# Patient Record
Sex: MALE | Race: WHITE | NOT HISPANIC OR LATINO | URBAN - METROPOLITAN AREA
[De-identification: names, ages, dates, MRNs, and addresses within clinical notes are randomized per-mention and may not be internally consistent; named-entity substitution may affect disease eponyms.]

---

## 2018-10-11 ENCOUNTER — ON CAMPUS - OUTPATIENT (AMBULATORY)
Dept: URBAN - METROPOLITAN AREA HOSPITAL 2 | Facility: HOSPITAL | Age: 68
End: 2018-10-11
Payer: COMMERCIAL

## 2018-10-11 ENCOUNTER — HOSPITAL ENCOUNTER (OUTPATIENT)
Dept: OTHER | Facility: HOSPITAL | Age: 68
Setting detail: SPECIMEN
Discharge: HOME | End: 2018-10-11
Attending: INTERNAL MEDICINE | Admitting: INTERNAL MEDICINE

## 2018-10-11 ENCOUNTER — OFFICE (AMBULATORY)
Dept: URBAN - METROPOLITAN AREA PATHOLOGY 4 | Facility: PATHOLOGY | Age: 68
End: 2018-10-11
Payer: COMMERCIAL

## 2018-10-11 VITALS
OXYGEN SATURATION: 99 % | SYSTOLIC BLOOD PRESSURE: 101 MMHG | HEART RATE: 61 BPM | SYSTOLIC BLOOD PRESSURE: 103 MMHG | SYSTOLIC BLOOD PRESSURE: 149 MMHG | RESPIRATION RATE: 18 BRPM | DIASTOLIC BLOOD PRESSURE: 56 MMHG | HEART RATE: 63 BPM | SYSTOLIC BLOOD PRESSURE: 123 MMHG | HEIGHT: 69 IN | OXYGEN SATURATION: 95 % | WEIGHT: 185 LBS | SYSTOLIC BLOOD PRESSURE: 142 MMHG | HEART RATE: 59 BPM | HEART RATE: 58 BPM | SYSTOLIC BLOOD PRESSURE: 106 MMHG | HEART RATE: 65 BPM | OXYGEN SATURATION: 98 % | TEMPERATURE: 98.1 F | SYSTOLIC BLOOD PRESSURE: 124 MMHG | RESPIRATION RATE: 16 BRPM | RESPIRATION RATE: 15 BRPM | OXYGEN SATURATION: 94 % | OXYGEN SATURATION: 97 % | DIASTOLIC BLOOD PRESSURE: 76 MMHG | DIASTOLIC BLOOD PRESSURE: 58 MMHG

## 2018-10-11 DIAGNOSIS — K52.9 NONINFECTIVE GASTROENTERITIS AND COLITIS, UNSPECIFIED: ICD-10-CM

## 2018-10-11 DIAGNOSIS — K63.5 POLYP OF COLON: ICD-10-CM

## 2018-10-11 DIAGNOSIS — D12.3 BENIGN NEOPLASM OF TRANSVERSE COLON: ICD-10-CM

## 2018-10-11 DIAGNOSIS — K57.30 DIVERTICULOSIS OF LARGE INTESTINE WITHOUT PERFORATION OR ABS: ICD-10-CM

## 2018-10-11 DIAGNOSIS — Z12.11 ENCOUNTER FOR SCREENING FOR MALIGNANT NEOPLASM OF COLON: ICD-10-CM

## 2018-10-11 DIAGNOSIS — K64.1 SECOND DEGREE HEMORRHOIDS: ICD-10-CM

## 2018-10-11 DIAGNOSIS — K63.3 ULCER OF INTESTINE: ICD-10-CM

## 2018-10-11 DIAGNOSIS — K64.4 RESIDUAL HEMORRHOIDAL SKIN TAGS: ICD-10-CM

## 2018-10-11 LAB
GI HISTOLOGY: A. UNSPECIFIED: (no result)
GI HISTOLOGY: B. UNSPECIFIED: (no result)
GI HISTOLOGY: PDF REPORT: (no result)

## 2018-10-11 PROCEDURE — 45380 COLONOSCOPY AND BIOPSY: CPT | Mod: PT | Performed by: INTERNAL MEDICINE

## 2018-10-11 PROCEDURE — 88305 TISSUE EXAM BY PATHOLOGIST: CPT | Mod: 26 | Performed by: INTERNAL MEDICINE

## 2018-10-11 NOTE — SERVICEHPINOTES
LY PORTER  is a  67  male   who presents today for a  Colonoscopy   for   the indications listed below. The updated Patient Profile was reviewed prior to the procedure, in conjunction with the Physical Exam, including medical conditions, surgical procedures, medications, allergies, family history and social history. See Physical Exam time stamp below for date and time of HPI completion.Pre-operatively, I reviewed the indication(s) for the procedure, the risks of the procedure [including but not limited to: unexpected bleeding possibly requiring hospitalization and/or unplanned repeat procedures, perforation possibly requiring surgical treatment, missed lesions and complications of sedation/MAC (also explained by anesthesia staff)]. I have evaluated the patient for risks associated with the planned anesthesia and the procedure to be performed and find the patient an acceptable candidate for IV sedation.Multiple opportunities were provided for any questions or concerns, and all questions were answered satisfactorily before any anesthesia was administered. We will proceed with the planned procedure.BR

## 2024-05-07 ENCOUNTER — OFFICE (AMBULATORY)
Dept: URBAN - METROPOLITAN AREA PATHOLOGY 19 | Facility: PATHOLOGY | Age: 74
End: 2024-05-07
Payer: MEDICARE

## 2024-05-07 ENCOUNTER — OFFICE (AMBULATORY)
Dept: URBAN - METROPOLITAN AREA PATHOLOGY 19 | Facility: PATHOLOGY | Age: 74
End: 2024-05-07
Payer: COMMERCIAL

## 2024-05-07 ENCOUNTER — ON CAMPUS - OUTPATIENT (AMBULATORY)
Dept: URBAN - METROPOLITAN AREA HOSPITAL 2 | Facility: HOSPITAL | Age: 74
End: 2024-05-07
Payer: MEDICARE

## 2024-05-07 VITALS
HEART RATE: 53 BPM | SYSTOLIC BLOOD PRESSURE: 114 MMHG | HEIGHT: 69 IN | RESPIRATION RATE: 17 BRPM | DIASTOLIC BLOOD PRESSURE: 50 MMHG | RESPIRATION RATE: 13 BRPM | DIASTOLIC BLOOD PRESSURE: 65 MMHG | OXYGEN SATURATION: 99 % | HEART RATE: 49 BPM | OXYGEN SATURATION: 98 % | TEMPERATURE: 98.3 F | OXYGEN SATURATION: 97 % | DIASTOLIC BLOOD PRESSURE: 66 MMHG | DIASTOLIC BLOOD PRESSURE: 70 MMHG | RESPIRATION RATE: 16 BRPM | SYSTOLIC BLOOD PRESSURE: 90 MMHG | HEART RATE: 50 BPM | SYSTOLIC BLOOD PRESSURE: 133 MMHG | HEART RATE: 56 BPM | WEIGHT: 169 LBS | SYSTOLIC BLOOD PRESSURE: 122 MMHG | DIASTOLIC BLOOD PRESSURE: 55 MMHG | SYSTOLIC BLOOD PRESSURE: 116 MMHG | DIASTOLIC BLOOD PRESSURE: 59 MMHG | SYSTOLIC BLOOD PRESSURE: 104 MMHG | OXYGEN SATURATION: 961226 % | OXYGEN SATURATION: 100 % | SYSTOLIC BLOOD PRESSURE: 137 MMHG

## 2024-05-07 DIAGNOSIS — D12.2 BENIGN NEOPLASM OF ASCENDING COLON: ICD-10-CM

## 2024-05-07 DIAGNOSIS — K64.1 SECOND DEGREE HEMORRHOIDS: ICD-10-CM

## 2024-05-07 DIAGNOSIS — K52.9 NONINFECTIVE GASTROENTERITIS AND COLITIS, UNSPECIFIED: ICD-10-CM

## 2024-05-07 DIAGNOSIS — K63.3 ULCER OF INTESTINE: ICD-10-CM

## 2024-05-07 DIAGNOSIS — Z86.010 PERSONAL HISTORY OF COLONIC POLYPS: ICD-10-CM

## 2024-05-07 DIAGNOSIS — K57.30 DIVERTICULOSIS OF LARGE INTESTINE WITHOUT PERFORATION OR ABS: ICD-10-CM

## 2024-05-07 DIAGNOSIS — Z09 ENCOUNTER FOR FOLLOW-UP EXAMINATION AFTER COMPLETED TREATMEN: ICD-10-CM

## 2024-05-07 PROBLEM — K63.5 POLYP OF COLON: Status: ACTIVE | Noted: 2024-05-07

## 2024-05-07 LAB
GI HISTOLOGY: A. TERMINAL ILEUM: (no result)
GI HISTOLOGY: B. ASCENDING COLON: (no result)
GI HISTOLOGY: PDF REPORT: (no result)

## 2024-05-07 PROCEDURE — 45380 COLONOSCOPY AND BIOPSY: CPT | Mod: PT | Performed by: INTERNAL MEDICINE

## 2024-05-07 PROCEDURE — 88305 TISSUE EXAM BY PATHOLOGIST: CPT | Mod: 26 | Performed by: PATHOLOGY

## 2025-04-25 ENCOUNTER — INPATIENT HOSPITAL (AMBULATORY)
Dept: URBAN - METROPOLITAN AREA HOSPITAL 76 | Facility: HOSPITAL | Age: 75
End: 2025-04-25
Payer: MEDICARE

## 2025-04-25 DIAGNOSIS — Z86.0101 PERSONAL HISTORY OF ADENOMATOUS AND SERRATED COLON POLYPS: ICD-10-CM

## 2025-04-25 DIAGNOSIS — D72.829 ELEVATED WHITE BLOOD CELL COUNT, UNSPECIFIED: ICD-10-CM

## 2025-04-25 DIAGNOSIS — K56.609 UNSPECIFIED INTESTINAL OBSTRUCTION, UNSPECIFIED AS TO PARTIA: ICD-10-CM

## 2025-04-25 PROCEDURE — 99232 SBSQ HOSP IP/OBS MODERATE 35: CPT | Mod: FS
